# Patient Record
Sex: MALE | Race: ASIAN | NOT HISPANIC OR LATINO | URBAN - METROPOLITAN AREA
[De-identification: names, ages, dates, MRNs, and addresses within clinical notes are randomized per-mention and may not be internally consistent; named-entity substitution may affect disease eponyms.]

---

## 2024-06-26 ENCOUNTER — EMERGENCY (EMERGENCY)
Facility: HOSPITAL | Age: 29
LOS: 1 days | Discharge: HOME | End: 2024-06-26
Attending: EMERGENCY MEDICINE | Admitting: EMERGENCY MEDICINE
Payer: SELF-PAY

## 2024-06-26 VITALS
DIASTOLIC BLOOD PRESSURE: 78 MMHG | HEIGHT: 63 IN | TEMPERATURE: 97 F | HEART RATE: 69 BPM | OXYGEN SATURATION: 99 % | SYSTOLIC BLOOD PRESSURE: 141 MMHG | WEIGHT: 143.3 LBS | RESPIRATION RATE: 16 BRPM

## 2024-06-26 PROCEDURE — 99283 EMERGENCY DEPT VISIT LOW MDM: CPT

## 2024-06-26 PROCEDURE — 99053 MED SERV 10PM-8AM 24 HR FAC: CPT

## 2024-06-26 NOTE — ED PROVIDER NOTE - NSFOLLOWUPINSTRUCTIONS_ED_ALL_ED_FT
You were seen in the Emergency Department for headache.     1) Advance activity as tolerated.   2) Continue all previously prescribed medications as directed.    3) Follow up with your primary care physician in 24-48 hours - take copies of your results.    4) Return to the Emergency Department for worsening or persistent symptoms, and/or ANY NEW OR CONCERNING SYMPTOMS.    you can take tylenol/acetaminophen 975mg every 6 hours for pain (do not exceed 1000mg per dose or 4000mg per day). be sure that any other medications you are taking do not contain tylenol/acetaminophen. if other medications do include tylenol/acetaminophen, be sure to include this in your calculations of one time dose and daily dose.     you can also take motrin/ibuprofen 600mg for pain.     acetaminophen/tylenol can be taken at the same time as motrin/ibuprofen for maximum pain relief or 3 hours apart for continuous pain relief.

## 2024-06-26 NOTE — ED ADULT TRIAGE NOTE - CHIEF COMPLAINT QUOTE
Patient brought to ED from the [facility] for H/A. Patient is under arrest. NAD noted. Patient brought to ED from the [facility] for H/A. Patient is under arrest. NAD noted. Patient is in handcuffs. No history.

## 2024-06-26 NOTE — ED PROVIDER NOTE - PHYSICAL EXAMINATION
General: non-toxic, NAD  HEENT: NCAT, PERRL  Cardiac: RRR, no murmurs, 2+ peripheral pulses  Resp: CTAB  Abdomen: soft, non-distended, bowel sounds present, no ttp, no rebound or guarding. no organomegaly  Extremities: no peripheral edema, calf tenderness, or leg size discrepancies  Skin: no rashes  Neuro: AAOx4, 5+motor, sensation grossly intact, CN 2-12 intact.   Psych: mood and affect appropriate

## 2024-06-26 NOTE — ED PROVIDER NOTE - PATIENT PORTAL LINK FT
You can access the FollowMyHealth Patient Portal offered by [facility] by registering at the following website: [email or web address]. By joining [employer]’s FollowMyHealth portal, you will also be able to view your health information using other applications (apps) compatible with our system.

## 2024-06-26 NOTE — ED ADULT NURSE NOTE - OBJECTIVE STATEMENT
Patient received to 8A, A&Ox3. Pt brought from [facility], pt under arrest.  at bedside, pt noted to be in forensic restraints. Pt endorsing headache, states he has not eaten or drank anything all day. Pt denies fevers, chills, dizziness, chest pain, SOB, palpitations, N/V/D. respirations even and unlabored. Pt given food and drink, states relief of symptoms. safety maintained throughout

## 2024-06-26 NOTE — ED PROVIDER NOTE - NS ED ROS FT
Constitutional: no fevers, chills  HEENT: no HA, vision changes, rhinorrhea, sore throat  Cardiac: no chest pain, palpitations  Respiratory: no SOB, cough or hemoptysis  GI: no n/v/d/c, abd pain, bloody or dark stools  : no dysuria, frequency, or hematuria  MSK: no joint pain, neck pain or back pain  Skin: no rashes, jaundice, pruritis  Neuro: no numbness/tingling, weakness, unsteady gait  ROS otherwise neg except per MDM

## 2024-06-26 NOTE — ED PROVIDER NOTE - CLINICAL SUMMARY MEDICAL DECISION MAKING FREE TEXT BOX
healthy 29y male presents with PD from booking for a report of headache. prior to headache pt had not eaten in a day and a half. headache resolved after food was provided. pt denies sudden onset, vision changes, numbness tingling weakness vomiting neck/back pain, fever or chills. no hx migraines or other headache syndrome. arrives with nonactionable VS and a nonfocal neuro exam. clear cardiopulmonary exam and benign abdomen. pt is back to his normal state of health. food provided. clear for return to police custody.    history facilitated by language line  Judy ID#[id number] Corina

## 2024-06-26 NOTE — ED ADULT NURSE NOTE - CHIEF COMPLAINT QUOTE
Patient brought to ED from the [address] for H/A. Patient is under arrest. NAD noted. Patient is in handcuffs. No history.